# Patient Record
Sex: FEMALE | Race: WHITE | NOT HISPANIC OR LATINO | URBAN - METROPOLITAN AREA
[De-identification: names, ages, dates, MRNs, and addresses within clinical notes are randomized per-mention and may not be internally consistent; named-entity substitution may affect disease eponyms.]

---
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
## 2023-05-02 ENCOUNTER — OFFICE VISIT (OUTPATIENT)
Dept: MEDICAL GROUP | Facility: CLINIC | Age: 18
End: 2023-05-02
Payer: COMMERCIAL

## 2023-05-02 VITALS
BODY MASS INDEX: 20.13 KG/M2 | HEART RATE: 80 BPM | TEMPERATURE: 97.9 F | DIASTOLIC BLOOD PRESSURE: 70 MMHG | HEIGHT: 68 IN | OXYGEN SATURATION: 98 % | WEIGHT: 132.8 LBS | SYSTOLIC BLOOD PRESSURE: 102 MMHG

## 2023-05-02 DIAGNOSIS — R53.83 MALAISE AND FATIGUE: ICD-10-CM

## 2023-05-02 DIAGNOSIS — R53.81 MALAISE AND FATIGUE: ICD-10-CM

## 2023-05-02 PROCEDURE — 99203 OFFICE O/P NEW LOW 30 MIN: CPT | Performed by: FAMILY MEDICINE

## 2023-05-02 NOTE — PROGRESS NOTES
"Subjective:     CC: Fatigue, adenopathy    HPI:   Miguel presents today to discuss the following issues     Problem   Malaise and Fatigue    Lorena Riggs is noting about a 1 month history of fatigue, fairly constant headaches and rhinorrhea.  She noted a small lump behind her left ear and wonders in addition if there are others in her neck.  She denies any fever or weight loss.  She does not feel particularly stressed.  She is not taking any medications now.  She does have some symptoms of seasonal allergy including rhinorrhea and sneezing with watery eyes.  She has not begun any medications for this.  She feels that her exercise tolerance is not where it should be.  Her past medical history is largely unremarkable although there is a pretty strong family history of Peggy-Danlos syndrome.         Current Outpatient Medications Ordered in Epic   Medication Sig Dispense Refill    acetaminophen (TYLENOL) 325 MG Tab Take 650 mg by mouth every four hours as needed.      ibuprofen (MOTRIN) 200 MG Tab Take 400 mg by mouth every 6 hours as needed.      CALCIUM PO Take  by mouth.      MAGNESIUM PO Take  by mouth.      Cholecalciferol (VITAMIN D PO) Take  by mouth.      Ascorbic Acid (VITAMIN C PO) Take  by mouth.      Echinacea 125 MG Cap Take  by mouth.       No current Epic-ordered facility-administered medications on file.       Health Maintenance:     ROS:  Gen: no fevers/chills, no changes in weight  Eyes: no changes in vision  ENT: no sore throat, no hearing loss, no bloody nose  Pulm: no sob, no cough  CV: no chest pain, no palpitations  GI: no nausea/vomiting, no diarrhea  : no dysuria  MSk: no myalgias  Skin: no rash  Neuro: no headaches, no numbness/tingling  Heme/Lymph: no easy bruising      Objective:     Exam:  /70 (BP Location: Left arm, Patient Position: Sitting, BP Cuff Size: Adult)   Pulse 80   Temp 36.6 °C (97.9 °F) (Temporal)   Ht 1.727 m (5' 8\")   Wt 60.2 kg (132 lb 12.8 oz)   SpO2 98%   BMI " 20.19 kg/m²  Body mass index is 20.19 kg/m².    Gen: Alert and oriented, No apparent distress.  Neck: Neck is supple without lymphadenopathy.  Lungs: Normal effort, CTA bilaterally, no wheezes, rhonchi, or rales  CV: Regular rate and rhythm. No murmurs, rubs, or gallops.  Ext: No clubbing, cyanosis, edema.          Assessment & Plan:     17 y.o. female with the following -     Problem List Items Addressed This Visit       Malaise and fatigue     Her exam is essentially normal, I do feel a may be 1 cm lymph node in the posterior auricular area on the left.  I do not feel any other significant adenopathy.  Heart lungs are clear abdomen is soft.  I see no red flag issues.  Would like to order some labs and she is in agreement.  I will send her an order for a CBC, CHEM panel, thyroi and ESR.   Meantime its possible that allergies are contributing to some of this and I will encourage her to go ahead and do steroid nasal spray to start.  Hold on further testing pending results of these labs.                No follow-ups on file.